# Patient Record
Sex: MALE | Race: WHITE
[De-identification: names, ages, dates, MRNs, and addresses within clinical notes are randomized per-mention and may not be internally consistent; named-entity substitution may affect disease eponyms.]

---

## 2023-09-16 ENCOUNTER — P2P PATIENT RECORD (OUTPATIENT)
Age: 33
End: 2023-09-16

## 2025-02-27 ENCOUNTER — LAB OUTSIDE AN ENCOUNTER (OUTPATIENT)
Dept: URBAN - METROPOLITAN AREA CLINIC 74 | Facility: CLINIC | Age: 35
End: 2025-02-27

## 2025-02-27 PROBLEM — 64766004: Status: ACTIVE | Noted: 2025-02-27

## 2025-03-03 ENCOUNTER — DASHBOARD ENCOUNTERS (OUTPATIENT)
Age: 35
End: 2025-03-03

## 2025-03-03 ENCOUNTER — LAB OUTSIDE AN ENCOUNTER (OUTPATIENT)
Dept: URBAN - METROPOLITAN AREA CLINIC 74 | Facility: CLINIC | Age: 35
End: 2025-03-03

## 2025-03-03 ENCOUNTER — OFFICE VISIT (OUTPATIENT)
Dept: URBAN - METROPOLITAN AREA CLINIC 74 | Facility: CLINIC | Age: 35
End: 2025-03-03
Payer: COMMERCIAL

## 2025-03-03 VITALS
TEMPERATURE: 97.7 F | HEIGHT: 67 IN | HEART RATE: 75 BPM | WEIGHT: 194 LBS | DIASTOLIC BLOOD PRESSURE: 68 MMHG | OXYGEN SATURATION: 95 % | BODY MASS INDEX: 30.45 KG/M2 | SYSTOLIC BLOOD PRESSURE: 118 MMHG

## 2025-03-03 DIAGNOSIS — R19.8 TENESMUS: ICD-10-CM

## 2025-03-03 DIAGNOSIS — Z86.0100 HX OF COLONIC POLYP: ICD-10-CM

## 2025-03-03 DIAGNOSIS — R74.8 ABNORMAL LIVER ENZYMES: ICD-10-CM

## 2025-03-03 DIAGNOSIS — K76.9 LIVER DISEASE: ICD-10-CM

## 2025-03-03 DIAGNOSIS — R11.14 BILIOUS VOMITING WITH NAUSEA: ICD-10-CM

## 2025-03-03 DIAGNOSIS — F10.20 UNCOMPLICATED ALCOHOL DEPENDENCE: ICD-10-CM

## 2025-03-03 DIAGNOSIS — K51.90 ULCERATIVE COLITIS, UNSPECIFIED: ICD-10-CM

## 2025-03-03 DIAGNOSIS — K62.5 RECTAL BLEED: ICD-10-CM

## 2025-03-03 DIAGNOSIS — R19.5 LOOSE STOOLS: ICD-10-CM

## 2025-03-03 PROBLEM — 66590003: Status: ACTIVE | Noted: 2025-03-03

## 2025-03-03 PROCEDURE — 99204 OFFICE O/P NEW MOD 45 MIN: CPT | Performed by: INTERNAL MEDICINE

## 2025-03-03 RX ORDER — FLUTICASONE PROPIONATE 50 UG/1
SPRAY 1 SPRAY (50 MCG) IN EACH NOSTRIL BY INTRANASAL ROUTE ONCE DAILY SPRAY, METERED NASAL 1
Qty: 1 | Refills: 0 | Status: ACTIVE | COMMUNITY
Start: 1900-01-01

## 2025-03-03 RX ORDER — RIVAROXABAN 10 MG/1
1 TABLET WITH FOOD TABLET, FILM COATED ORAL ONCE A DAY
Status: ACTIVE | COMMUNITY

## 2025-03-03 RX ORDER — OMEPRAZOLE 40 MG/1
TAKE 1 CAPSULE BY ORAL ROUTE DAILY FOR 30 DAYS CAPSULE, DELAYED RELEASE PELLETS ORAL 1
Qty: 30 | Refills: 5 | Status: ACTIVE | COMMUNITY
Start: 2017-05-30

## 2025-03-03 RX ORDER — INFLIXIMAB 100 MG/10ML
INJECTION, POWDER, LYOPHILIZED, FOR SOLUTION INTRAVENOUS
Qty: 0 | Refills: 0 | Status: ON HOLD | COMMUNITY
Start: 1900-01-01

## 2025-03-03 NOTE — HPI-TODAY'S VISIT:
-- Patient presents for evaluation of ulcerative colitis.  He also has a history of a large colon polyp and is overdue for surveillance.  He has been lost to follow-up since 2019.  He was followed previously by Dr. Zachariah Fang. - He presents today to reestablish care with a gastroenterologist.  He has not been seen in over 5 years.  His wife recommended he come see me for evaluation.  He has overall doing much better in regards to his colitis in his own words.  He stopped Remicade years ago.  He tries to follow holistic medicine, he is very concerned about GI side effects and side effects of medication in general.  Currently he is using mushroom turmeric indigo and other over-the-counter agents which he feels treats his colitis symptoms better than Remicade or mesalamine or prednisone in the past.  He was on Remicade for many years and he felt better on Remicade but never reached full remission.  He did not respond to mesalamine.  He had multiple prednisone tapers in the past but none over the past 5 years.  In his own words he says he has not had a normal bowel movement in over 8 years or at least since he was diagnosed in 2008.  He has frequent Anastas, is not able to take long car rides, some rectal bleeding, occasional flares.  No joint aches or pains.  No jaundice.  He does admit to moderate to heavy alcohol use.  He drank heavy in the past and daily.  Over the past few months he has tapered this down to heavy drinking on the weekend.  No marijuana or IV drug use.  He admits that he has a difficult time stopping drinking.  He also has a history of pulmonary embolism x 2 in the past.  He was on Xarelto but he stopped taking his blood thinner a long time ago and does not have recurrence.  He does not want a be on blood thinners or medicine in general. - Colonoscopy 2018 was negative for colitis.  A 15 mm polyp was removed from the sigmoid colon.  This was an inflammatory polyp.  No evidence of Dalm lesion.  Biopsies from the entire colon were otherwise negative for active inflammation and showed quiescent colitis.  - Colonoscopy 2014 negative for active colitis.  - Previously on Remicade long-term.  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  - Last seen here 2018, no change in medication. - CT abdomen and pelvis 2023 at [facility] was negative for inflammation, negative for colitis, negative for acute change.  - History of elevated liver enzymes,  AST 81 bilirubin 0.4 albumin 4.5 sodium 137 creatinine 0.9, these labs were from December 2024 2 months ago.  Throughout last year he had elevation in AST and ALT in the 2-3 X range, no jaundice.  Imaging of the liver from 2023 was otherwise negative.

## 2025-03-12 LAB
% SATURATION: 7
A/G RATIO: 1
ABSOLUTE BASOPHILS: 99
ABSOLUTE EOSINOPHILS: 990
ABSOLUTE LYMPHOCYTES: 2396
ABSOLUTE MONOCYTES: 663
ABSOLUTE NEUTROPHILS: 5752
ACTIN (SMOOTH MUSCLE) ANTIBODY (IGG): <20
ALBUMIN: 3.5
ALKALINE PHOSPHATASE: 62
ALT (SGPT): 31
ANA SCREEN, IFA: NEGATIVE
AST (SGOT): 30
BASOPHILS: 1
BILIRUBIN, TOTAL: 0.2
BUN/CREATININE RATIO: (no result)
BUN: 8
CALCIUM: 9
CARBON DIOXIDE, TOTAL: 28
CHLORIDE: 102
CREATININE: 0.68
EGFR: 125
EOSINOPHILS: 10
FERRITIN: 162
GGT: 22
GLOBULIN, TOTAL: 3.5
GLUCOSE: 119
HBSAG SCREEN: (no result)
HEMATOCRIT: 34.3
HEMOGLOBIN: 11.6
HEP A AB, IGM: (no result)
HEP B CORE AB, IGM: (no result)
HEPATITIS C ANTIBODY: (no result)
HEREDITARY HEMOCHROMATOSIS DNA MUT: (no result)
IMMUNOGLOBULIN A: 403
INR: 1
INTERPRETATION: (no result)
IRON BINDING CAPACITY: 258
IRON, TOTAL: 18
LYMPHOCYTES: 24.2
MCH: 32.4
MCHC: 33.8
MCV: 95.8
MITOCHONDRIAL (M2) ANTIBODY: <=20
MONOCYTES: 6.7
MPV: 9.9
NEUTROPHILS: 58.1
PLATELET COUNT: 725
POTASSIUM: 4.3
PROTEIN, TOTAL: 7
PT: 10.3
RDW: 14.7
RED BLOOD CELL COUNT: 3.58
SODIUM: 138
TISSUE TRANSGLUTAMINASE AB, IGA: <1
WHITE BLOOD CELL COUNT: 9.9

## 2025-04-04 ENCOUNTER — CLAIMS CREATED FROM THE CLAIM WINDOW (OUTPATIENT)
Dept: URBAN - METROPOLITAN AREA CLINIC 4 | Facility: CLINIC | Age: 35
End: 2025-04-04
Payer: COMMERCIAL

## 2025-04-04 ENCOUNTER — OFFICE VISIT (OUTPATIENT)
Dept: URBAN - METROPOLITAN AREA SURGERY CENTER 30 | Facility: SURGERY CENTER | Age: 35
End: 2025-04-04
Payer: COMMERCIAL

## 2025-04-04 ENCOUNTER — TELEPHONE ENCOUNTER (OUTPATIENT)
Dept: URBAN - METROPOLITAN AREA CLINIC 40 | Facility: CLINIC | Age: 35
End: 2025-04-04

## 2025-04-04 DIAGNOSIS — K51.011 ULCERATIVE PANCOLITIS WITH RECTAL BLEEDING: ICD-10-CM

## 2025-04-04 DIAGNOSIS — Z87.19 HISTORY OF ULCERATIVE COLITIS: ICD-10-CM

## 2025-04-04 DIAGNOSIS — K51.919 ULCERATIVE COLITIS, UNSPECIFIED WITH UNSPECIFIED COMPLICATIONS: ICD-10-CM

## 2025-04-04 DIAGNOSIS — K51.919 ULCERATIVE COLITIS WITH COMPLICATION, UNSPECIFIED LOCATION: ICD-10-CM

## 2025-04-04 PROBLEM — 444548001: Status: ACTIVE | Noted: 2025-04-04

## 2025-04-04 PROBLEM — 442159003: Status: ACTIVE | Noted: 2025-04-04

## 2025-04-04 PROCEDURE — 88342 IMHCHEM/IMCYTCHM 1ST ANTB: CPT | Performed by: PATHOLOGY

## 2025-04-04 PROCEDURE — 45380 COLONOSCOPY AND BIOPSY: CPT | Performed by: INTERNAL MEDICINE

## 2025-04-04 PROCEDURE — 00811 ANES LWR INTST NDSC NOS: CPT | Performed by: NURSE ANESTHETIST, CERTIFIED REGISTERED

## 2025-04-04 PROCEDURE — 88305 TISSUE EXAM BY PATHOLOGIST: CPT | Performed by: PATHOLOGY

## 2025-04-04 PROCEDURE — 88341 IMHCHEM/IMCYTCHM EA ADD ANTB: CPT | Performed by: PATHOLOGY

## 2025-04-04 RX ORDER — UPADACITINIB 45 MG/1
1 TABLET TABLET, EXTENDED RELEASE ORAL ONCE A DAY
Qty: 60 TABLET | Refills: 0 | OUTPATIENT
Start: 2025-04-04 | End: 2025-06-03

## 2025-04-04 RX ORDER — RIVAROXABAN 10 MG/1
1 TABLET WITH FOOD TABLET, FILM COATED ORAL ONCE A DAY
Status: ACTIVE | COMMUNITY

## 2025-04-04 RX ORDER — INFLIXIMAB 100 MG/10ML
INJECTION, POWDER, LYOPHILIZED, FOR SOLUTION INTRAVENOUS
Qty: 0 | Refills: 0 | Status: ON HOLD | COMMUNITY
Start: 1900-01-01

## 2025-04-04 RX ORDER — OMEPRAZOLE 40 MG/1
TAKE 1 CAPSULE BY ORAL ROUTE DAILY FOR 30 DAYS CAPSULE, DELAYED RELEASE PELLETS ORAL 1
Qty: 30 | Refills: 5 | Status: ACTIVE | COMMUNITY
Start: 2017-05-30

## 2025-04-04 RX ORDER — FLUTICASONE PROPIONATE 50 UG/1
SPRAY 1 SPRAY (50 MCG) IN EACH NOSTRIL BY INTRANASAL ROUTE ONCE DAILY SPRAY, METERED NASAL 1
Qty: 1 | Refills: 0 | Status: ACTIVE | COMMUNITY
Start: 1900-01-01

## 2025-04-04 RX ORDER — UPADACITINIB 30 MG/1
1 TABLET TABLET, EXTENDED RELEASE ORAL ONCE A DAY
Qty: 90 TABLET | Refills: 3 | OUTPATIENT
Start: 2025-04-04 | End: 2026-03-30

## 2025-04-04 NOTE — HPI-TODAY'S VISIT:
Colonoscopy today confirmed severe pancolitis, active ulcerative colitis.  Biopsy pending. CLD panel and hepatitis panel negative. AST/ALT normalized.  PLAN: -Pt wishes to start Rinvoq (prior Remicade). -Order sent to [facility]. -Samples of Rinvoq 45mg 30 days provided today free. -Order to check Quant Gold next week. 1

## 2025-04-11 LAB
MITOGEN-NIL: 8.29
QUANTIFERON NIL VALUE: 0.09
QUANTIFERON TB1 AG VALUE: <0
QUANTIFERON TB2 AG VALUE: <0
QUANTIFERON-TB GOLD PLUS: NEGATIVE

## 2025-04-23 ENCOUNTER — TELEPHONE ENCOUNTER (OUTPATIENT)
Dept: URBAN - METROPOLITAN AREA CLINIC 6 | Facility: CLINIC | Age: 35
End: 2025-04-23

## 2025-05-02 ENCOUNTER — LAB OUTSIDE AN ENCOUNTER (OUTPATIENT)
Dept: URBAN - METROPOLITAN AREA CLINIC 74 | Facility: CLINIC | Age: 35
End: 2025-05-02

## 2025-05-02 ENCOUNTER — OFFICE VISIT (OUTPATIENT)
Dept: URBAN - METROPOLITAN AREA CLINIC 74 | Facility: CLINIC | Age: 35
End: 2025-05-02
Payer: COMMERCIAL

## 2025-05-02 DIAGNOSIS — R74.01 TRANSAMINITIS: ICD-10-CM

## 2025-05-02 DIAGNOSIS — K51.00 ULCERATIVE CHRONIC PANCOLITIS WITHOUT COMPLICATIONS: ICD-10-CM

## 2025-05-02 DIAGNOSIS — D50.0 IRON DEFICIENCY ANEMIA DUE TO CHRONIC BLOOD LOSS: ICD-10-CM

## 2025-05-02 DIAGNOSIS — F10.90 ALCOHOL USE: ICD-10-CM

## 2025-05-02 PROBLEM — 724556004: Status: ACTIVE | Noted: 2025-05-02

## 2025-05-02 PROCEDURE — 99214 OFFICE O/P EST MOD 30 MIN: CPT | Performed by: PHYSICIAN ASSISTANT

## 2025-05-02 RX ORDER — UPADACITINIB 45 MG/1
1 TABLET TABLET, EXTENDED RELEASE ORAL ONCE A DAY
Qty: 60 TABLET | Refills: 0
Start: 2025-04-04 | End: 2025-07-01

## 2025-05-02 RX ORDER — UPADACITINIB 30 MG/1
1 TABLET TABLET, EXTENDED RELEASE ORAL ONCE A DAY
Qty: 90 TABLET | Refills: 3 | Status: ACTIVE | COMMUNITY
Start: 2025-04-04 | End: 2026-03-30

## 2025-05-02 RX ORDER — UPADACITINIB 45 MG/1
1 TABLET TABLET, EXTENDED RELEASE ORAL ONCE A DAY
Qty: 60 TABLET | Refills: 0 | Status: ACTIVE | COMMUNITY
Start: 2025-04-04 | End: 2025-06-03

## 2025-05-02 RX ORDER — RIVAROXABAN 10 MG/1
1 TABLET WITH FOOD TABLET, FILM COATED ORAL ONCE A DAY
Status: ACTIVE | COMMUNITY

## 2025-05-02 RX ORDER — HYDROCHLOROTHIAZIDE 25 MG/1
1 TABLET IN THE MORNING TABLET ORAL ONCE A DAY
Status: ACTIVE | COMMUNITY

## 2025-05-02 RX ORDER — OMEPRAZOLE 40 MG/1
TAKE 1 CAPSULE BY ORAL ROUTE DAILY FOR 30 DAYS CAPSULE, DELAYED RELEASE PELLETS ORAL 1
Qty: 30 | Refills: 5 | Status: ACTIVE | COMMUNITY
Start: 2017-05-30

## 2025-05-02 RX ORDER — VALSARTAN 320 MG/1
1 TABLET TABLET, FILM COATED ORAL ONCE A DAY
Status: ACTIVE | COMMUNITY

## 2025-05-02 RX ORDER — BISOPROLOL FUMARATE 10 MG/1
1 TABLET TABLET, FILM COATED ORAL ONCE A DAY
Status: ACTIVE | COMMUNITY

## 2025-05-02 RX ORDER — UPADACITINIB 30 MG/1
1 TABLET TABLET, EXTENDED RELEASE ORAL ONCE A DAY
Qty: 90 TABLET | Refills: 3
Start: 2025-04-04 | End: 2026-04-27

## 2025-05-02 RX ORDER — INFLIXIMAB 100 MG/10ML
INJECTION, POWDER, LYOPHILIZED, FOR SOLUTION INTRAVENOUS
Qty: 0 | Refills: 0 | Status: ON HOLD | COMMUNITY
Start: 1900-01-01

## 2025-05-02 RX ORDER — FLUTICASONE PROPIONATE 50 UG/1
SPRAY 1 SPRAY (50 MCG) IN EACH NOSTRIL BY INTRANASAL ROUTE ONCE DAILY SPRAY, METERED NASAL 1
Qty: 1 | Refills: 0 | Status: ACTIVE | COMMUNITY
Start: 1900-01-01

## 2025-05-02 NOTE — HPI-TODAY'S VISIT:
The patient is 34-year-old female with past medical history as noted below known history of Panulcerative colitis currently on Rinvoq 45 mg one tablet daily is presenting to our clinic today to discuss her recent colonoscopy results. He is doing much better since he started on Rinvoq 40 mg once daily. He has completd 4 weeks of therapy. He follow with [facility] Dr. Farshad Penaloza with recent Fe infusion and improvement of FE level. He had recent labs with elevated LFT's. His LFT's were normal when he was started on Rinvoq. He staed that he drinks 3-4 times per week Vodka or Gin. No Tobacco.   Medication history for ulcerative colitis: 5-ASA, steroids, Remicade, and now Rinvoq  Diagnostic studies: -- Labs on 04/25/2025 CBC with WBC 4.6, hemoglobin 13.6, hematocrit 41.7, and platelet 548. Fe panel with Fe 92, TIBC 423, % saturation 22, and Ferritin 158.  Vitamin B12 909.  Folic acid 5.3.  CMP with sodium 135, potassium 4.6, BUN 15, creatinine 0.7, ALP 77, AST 81, , and TB 0.4.  -- Labs on 03/08/2025 CMP with BUN 8, creatinine 0.6, ALP 62, AST 30, ALT 31, Albumin 3.5, and TB 0.2.  GGT 22.  CBC with WBC 9.9, hemoglobin 11.6, hematocrit 34.3, and platelets 725.  Fe panel with Fe 18, TIBC 258,  % saturation 7, and Ferritin 162.  PT 10.3.  INR 1.0.  AMA negative.  ASMA negative.  Celiac sprue negative with IgA 403.  HHMA undetectable.  Acute hepatitis panel undetectable.  QuantiFERON negative.  Procedure: -- Colonoscopy with biopsy on 04/04/2025 by Dr. Flores noted pancolitis ulcerative colitis.  Inflammation was found from the anus to the cecum.  This was severe and worsened compared to previous examination.  The examined portion of the ileum was normal.  Internal hemorrhoids.  Repeat colonoscopy in 1 year for surveillance.  Biopsy right colon with diffuse chronic active colitis, consistent with ulcerative colitis.  Left colon with diffuse chronic active colitis, consistent with ulcerative colitis.

## 2025-05-22 ENCOUNTER — OFFICE VISIT (OUTPATIENT)
Dept: URBAN - METROPOLITAN AREA CLINIC 73 | Facility: CLINIC | Age: 35
End: 2025-05-22

## 2025-06-05 ENCOUNTER — OFFICE VISIT (OUTPATIENT)
Dept: URBAN - METROPOLITAN AREA CLINIC 73 | Facility: CLINIC | Age: 35
End: 2025-06-05
Payer: COMMERCIAL

## 2025-06-05 ENCOUNTER — OFFICE VISIT (OUTPATIENT)
Dept: URBAN - METROPOLITAN AREA CLINIC 73 | Facility: CLINIC | Age: 35
End: 2025-06-05

## 2025-06-05 DIAGNOSIS — K76.0 FATTY LIVER: ICD-10-CM

## 2025-06-05 DIAGNOSIS — N20.0 CALCULUS OF RIGHT KIDNEY: ICD-10-CM

## 2025-06-05 PROCEDURE — 76705 ECHO EXAM OF ABDOMEN: CPT | Performed by: INTERNAL MEDICINE

## 2025-06-05 RX ORDER — UPADACITINIB 45 MG/1
1 TABLET TABLET, EXTENDED RELEASE ORAL ONCE A DAY
Qty: 60 TABLET | Refills: 0 | Status: ACTIVE | COMMUNITY
Start: 2025-04-04 | End: 2025-07-01

## 2025-06-05 RX ORDER — UPADACITINIB 30 MG/1
1 TABLET TABLET, EXTENDED RELEASE ORAL ONCE A DAY
Qty: 90 TABLET | Refills: 3 | Status: ACTIVE | COMMUNITY
Start: 2025-04-04 | End: 2026-04-27

## 2025-06-05 RX ORDER — INFLIXIMAB 100 MG/10ML
INJECTION, POWDER, LYOPHILIZED, FOR SOLUTION INTRAVENOUS
Qty: 0 | Refills: 0 | Status: ON HOLD | COMMUNITY
Start: 1900-01-01

## 2025-06-05 RX ORDER — FLUTICASONE PROPIONATE 50 UG/1
SPRAY 1 SPRAY (50 MCG) IN EACH NOSTRIL BY INTRANASAL ROUTE ONCE DAILY SPRAY, METERED NASAL 1
Qty: 1 | Refills: 0 | Status: ACTIVE | COMMUNITY
Start: 1900-01-01

## 2025-06-05 RX ORDER — RIVAROXABAN 10 MG/1
1 TABLET WITH FOOD TABLET, FILM COATED ORAL ONCE A DAY
Status: ACTIVE | COMMUNITY

## 2025-06-05 RX ORDER — VALSARTAN 320 MG/1
1 TABLET TABLET, FILM COATED ORAL ONCE A DAY
Status: ACTIVE | COMMUNITY

## 2025-06-05 RX ORDER — HYDROCHLOROTHIAZIDE 25 MG/1
1 TABLET IN THE MORNING TABLET ORAL ONCE A DAY
Status: ACTIVE | COMMUNITY

## 2025-06-05 RX ORDER — BISOPROLOL FUMARATE 10 MG/1
1 TABLET TABLET, FILM COATED ORAL ONCE A DAY
Status: ACTIVE | COMMUNITY

## 2025-06-05 RX ORDER — OMEPRAZOLE 40 MG/1
TAKE 1 CAPSULE BY ORAL ROUTE DAILY FOR 30 DAYS CAPSULE, DELAYED RELEASE PELLETS ORAL 1
Qty: 30 | Refills: 5 | Status: ACTIVE | COMMUNITY
Start: 2017-05-30

## 2025-06-06 ENCOUNTER — OFFICE VISIT (OUTPATIENT)
Dept: URBAN - METROPOLITAN AREA CLINIC 74 | Facility: CLINIC | Age: 35
End: 2025-06-06

## 2025-06-11 PROBLEM — 267434003: Status: ACTIVE | Noted: 2025-06-11

## 2025-06-13 ENCOUNTER — OFFICE VISIT (OUTPATIENT)
Dept: URBAN - METROPOLITAN AREA CLINIC 74 | Facility: CLINIC | Age: 35
End: 2025-06-13
Payer: COMMERCIAL

## 2025-06-13 DIAGNOSIS — E78.2 MIXED HYPERLIPIDEMIA: ICD-10-CM

## 2025-06-13 DIAGNOSIS — K76.0 HEPATIC STEATOSIS: ICD-10-CM

## 2025-06-13 DIAGNOSIS — K51.00 ULCERATIVE CHRONIC PANCOLITIS WITHOUT COMPLICATIONS: ICD-10-CM

## 2025-06-13 DIAGNOSIS — D50.0 IRON DEFICIENCY ANEMIA DUE TO CHRONIC BLOOD LOSS: ICD-10-CM

## 2025-06-13 PROBLEM — 197321007: Status: ACTIVE | Noted: 2025-06-05

## 2025-06-13 PROCEDURE — 99214 OFFICE O/P EST MOD 30 MIN: CPT | Performed by: PHYSICIAN ASSISTANT

## 2025-06-13 RX ORDER — RIVAROXABAN 10 MG/1
1 TABLET WITH FOOD TABLET, FILM COATED ORAL ONCE A DAY
Status: ACTIVE | COMMUNITY

## 2025-06-13 RX ORDER — UPADACITINIB 30 MG/1
1 TABLET TABLET, EXTENDED RELEASE ORAL ONCE A DAY
Qty: 90 TABLET | Refills: 3
Start: 2025-06-04 | End: 2026-05-30

## 2025-06-13 RX ORDER — VALSARTAN 320 MG/1
1 TABLET TABLET, FILM COATED ORAL ONCE A DAY
Status: ACTIVE | COMMUNITY

## 2025-06-13 RX ORDER — FLUTICASONE PROPIONATE 50 UG/1
SPRAY 1 SPRAY (50 MCG) IN EACH NOSTRIL BY INTRANASAL ROUTE ONCE DAILY SPRAY, METERED NASAL 1
Qty: 1 | Refills: 0 | Status: ACTIVE | COMMUNITY
Start: 1900-01-01

## 2025-06-13 RX ORDER — UPADACITINIB 45 MG/1
1 TABLET TABLET, EXTENDED RELEASE ORAL ONCE A DAY
Qty: 60 TABLET | Refills: 0 | Status: ACTIVE | COMMUNITY
Start: 2025-04-04 | End: 2025-07-01

## 2025-06-13 RX ORDER — OMEPRAZOLE 40 MG/1
TAKE 1 CAPSULE BY ORAL ROUTE DAILY FOR 30 DAYS CAPSULE, DELAYED RELEASE PELLETS ORAL 1
Qty: 30 | Refills: 5 | Status: ACTIVE | COMMUNITY
Start: 2017-05-30

## 2025-06-13 RX ORDER — BISOPROLOL FUMARATE 10 MG/1
1 TABLET TABLET, FILM COATED ORAL ONCE A DAY
Status: ACTIVE | COMMUNITY

## 2025-06-13 RX ORDER — UPADACITINIB 30 MG/1
1 TABLET TABLET, EXTENDED RELEASE ORAL ONCE A DAY
Qty: 90 TABLET | Refills: 3 | Status: ACTIVE | COMMUNITY
Start: 2025-04-04 | End: 2026-04-27

## 2025-06-13 RX ORDER — HYDROCHLOROTHIAZIDE 25 MG/1
1 TABLET IN THE MORNING TABLET ORAL ONCE A DAY
Status: ACTIVE | COMMUNITY

## 2025-06-13 RX ORDER — UPADACITINIB 45 MG/1
1 TABLET TABLET, EXTENDED RELEASE ORAL ONCE A DAY
Qty: 60 TABLET | Refills: 0
Start: 2025-06-04 | End: 2025-08-03

## 2025-06-13 NOTE — HPI-TODAY'S VISIT:
The patient is 34-year-old female with past medical history as noted below known history of Panulcerative colitis currently on Rinvoq 45 mg one tablet daily known to Dr. Flores is presenting to our clinic today for follow up appointment to discuss his recent US and lab results. He continues on Rinvoq 40 mg once daily. He follows with [facility] Dr. Farshad Penaloza with recent Fe infusion and improvement of Fe level. He has cut down on his ETOH intake. No new GI issues today.   Medication history for ulcerative colitis: 5-ASA, steroids, Remicade, and now Rinvoq  Diagnostic studies: -- Labs on 06/10/2025 CMP with BUN 16, creatinine 0.9, ALP 56, AST 38, ALT 44, and TB 0.3.  Ceruloplasmin 21.  Alpha 1 antitrypsin 101.  IgG 1958.  IgA 361.  IgM 102.  ASMA negative.  AMA negative.  Lipid panel with cholesterol 244, triglyceride 175, HDL 70, and .  -- RUQ US on 06/05/2025 noted liver appears mildly echogenic which may reflect some mild hepatic steatosis.  No focal liver lesion.  7 mm echogenic focus in the right kidney possibly small nonshadowing known obstructing renal calculus.  Follow-up recommended.  -- Labs on 04/25/2025 CBC with WBC 4.6, hemoglobin 13.6, hematocrit 41.7, and platelet 548. Fe panel with Fe 92, TIBC 423, % saturation 22, and Ferritin 158.  Vitamin B12 909.  Folic acid 5.3.  CMP with sodium 135, potassium 4.6, BUN 15, creatinine 0.7, ALP 77, AST 81, , and TB 0.4.  -- Labs on 03/08/2025 CMP with BUN 8, creatinine 0.6, ALP 62, AST 30, ALT 31, Albumin 3.5, and TB 0.2.  GGT 22.  CBC with WBC 9.9, hemoglobin 11.6, hematocrit 34.3, and platelets 725.  Fe panel with Fe 18, TIBC 258,  % saturation 7, and Ferritin 162.  PT 10.3.  INR 1.0.  AMA negative.  ASMA negative.  Celiac sprue negative with IgA 403.  HHMA undetectable.  Acute hepatitis panel undetectable.  QuantiFERON negative.  Procedure: -- Colonoscopy with biopsy on 04/04/2025 by Dr. Flores noted pancolitis ulcerative colitis.  Inflammation was found from the anus to the cecum.  This was severe and worsened compared to previous examination.  The examined portion of the ileum was normal.  Internal hemorrhoids.  Repeat colonoscopy in 1 year for surveillance.  Biopsy right colon with diffuse chronic active colitis, consistent with ulcerative colitis.  Left colon with diffuse chronic active colitis, consistent with ulcerative colitis.

## 2025-07-03 ENCOUNTER — WEB ENCOUNTER (OUTPATIENT)
Dept: URBAN - METROPOLITAN AREA CLINIC 74 | Facility: CLINIC | Age: 35
End: 2025-07-03

## 2025-07-03 RX ORDER — UPADACITINIB 30 MG/1
1 TABLET TABLET, EXTENDED RELEASE ORAL ONCE A DAY
Qty: 90 TABLET | Refills: 1 | OUTPATIENT
Start: 2025-07-07 | End: 2026-01-03